# Patient Record
Sex: MALE | Race: WHITE
[De-identification: names, ages, dates, MRNs, and addresses within clinical notes are randomized per-mention and may not be internally consistent; named-entity substitution may affect disease eponyms.]

---

## 2019-02-23 ENCOUNTER — HOSPITAL ENCOUNTER (EMERGENCY)
Dept: HOSPITAL 14 - H.ER | Age: 44
Discharge: HOME | End: 2019-02-23
Payer: COMMERCIAL

## 2019-02-23 VITALS — BODY MASS INDEX: 24 KG/M2

## 2019-02-23 VITALS — HEART RATE: 96 BPM | TEMPERATURE: 98.8 F

## 2019-02-23 VITALS — DIASTOLIC BLOOD PRESSURE: 85 MMHG | SYSTOLIC BLOOD PRESSURE: 154 MMHG

## 2019-02-23 VITALS — OXYGEN SATURATION: 98 %

## 2019-02-23 VITALS — RESPIRATION RATE: 18 BRPM

## 2019-02-23 DIAGNOSIS — J11.1: Primary | ICD-10-CM

## 2019-02-23 LAB
ALBUMIN SERPL-MCNC: 4.2 G/DL (ref 3.5–5)
ALBUMIN/GLOB SERPL: 1.2 {RATIO} (ref 1–2.1)
ALT SERPL-CCNC: 24 U/L (ref 21–72)
ANISOCYTOSIS BLD QL SMEAR: SLIGHT
AST SERPL-CCNC: 22 U/L (ref 17–59)
BASOPHILS # BLD AUTO: 0 K/UL (ref 0–0.2)
BASOPHILS NFR BLD: 0.2 % (ref 0–2)
BILIRUB UR-MCNC: NEGATIVE MG/DL
BUN SERPL-MCNC: 17 MG/DL (ref 9–20)
CALCIUM SERPL-MCNC: 9.1 MG/DL (ref 8.4–10.2)
COLOR UR: (no result)
EOSINOPHIL # BLD AUTO: 0.1 K/UL (ref 0–0.7)
EOSINOPHIL NFR BLD: 1 % (ref 0–7)
EOSINOPHIL NFR BLD: 1.3 % (ref 0–4)
ERYTHROCYTE [DISTWIDTH] IN BLOOD BY AUTOMATED COUNT: 12.2 % (ref 11.5–14.5)
GFR NON-AFRICAN AMERICAN: > 60
GLUCOSE UR STRIP-MCNC: (no result) MG/DL
HGB BLD-MCNC: 12.9 G/DL (ref 12–18)
LEUKOCYTE ESTERASE UR-ACNC: (no result) LEU/UL
LYMPHOCYTE: 9 % (ref 20–50)
LYMPHOCYTES # BLD AUTO: 0.6 K/UL (ref 1–4.3)
LYMPHOCYTES NFR BLD AUTO: 9.6 % (ref 20–40)
MCH RBC QN AUTO: 29.8 PG (ref 27–31)
MCHC RBC AUTO-ENTMCNC: 33.7 G/DL (ref 33–37)
MCV RBC AUTO: 88.6 FL (ref 80–94)
MONOCYTE: 1 % (ref 0–10)
MONOCYTES # BLD: 0.4 K/UL (ref 0–0.8)
MONOCYTES NFR BLD: 6.2 % (ref 0–10)
NEUTROPHILS # BLD: 5 K/UL (ref 1.8–7)
NEUTROPHILS NFR BLD AUTO: 82 % (ref 42–75)
NEUTROPHILS NFR BLD AUTO: 82.7 % (ref 50–75)
NEUTS BAND NFR BLD: 3 % (ref 0–2)
NRBC BLD AUTO-RTO: 0 % (ref 0–0)
PH UR STRIP: 6 [PH] (ref 5–8)
PLATELET # BLD EST: NORMAL 10*3/UL
PLATELET # BLD: 173 K/UL (ref 130–400)
PMV BLD AUTO: 7.9 FL (ref 7.2–11.7)
PROT UR STRIP-MCNC: NEGATIVE MG/DL
RBC # BLD AUTO: 4.32 MIL/UL (ref 4.4–5.9)
RBC # UR STRIP: (no result) /UL
SP GR UR STRIP: 1.01 (ref 1–1.03)
SPHEROCYTES BLD QL SMEAR: SLIGHT
SQUAMOUS EPITHIAL: < 1 /HPF (ref 0–5)
TOTAL CELLS COUNTED BLD: 100
URINE CLARITY: CLEAR
UROBILINOGEN UR-MCNC: (no result) MG/DL (ref 0.2–1)
VARIANT LYMPHS NFR BLD MANUAL: 4 % (ref 0–0)
WBC # BLD AUTO: 6 K/UL (ref 4.8–10.8)

## 2019-02-23 PROCEDURE — 80053 COMPREHEN METABOLIC PANEL: CPT

## 2019-02-23 PROCEDURE — 81003 URINALYSIS AUTO W/O SCOPE: CPT

## 2019-02-23 PROCEDURE — 86308 HETEROPHILE ANTIBODY SCREEN: CPT

## 2019-02-23 PROCEDURE — 87804 INFLUENZA ASSAY W/OPTIC: CPT

## 2019-02-23 PROCEDURE — 96360 HYDRATION IV INFUSION INIT: CPT

## 2019-02-23 PROCEDURE — 87430 STREP A AG IA: CPT

## 2019-02-23 PROCEDURE — 99283 EMERGENCY DEPT VISIT LOW MDM: CPT

## 2019-02-23 PROCEDURE — 85025 COMPLETE CBC W/AUTO DIFF WBC: CPT

## 2019-02-23 PROCEDURE — 87040 BLOOD CULTURE FOR BACTERIA: CPT

## 2019-02-23 NOTE — ED PDOC
History of Present Illness


History of Present Illness: 





44 years old [place] male presents to ER for evaluation of flu-like symptoms 

for 1 day. Patient reports fever with T-max of 103 degree, chills, bodyaches and

nasal congestion. He states he took Ibuprofen and just completed Amoxicillin and

Augmentin. Patient denies taking flu vaccine, vomiting, diarrhea or shortness of

breath.





PMD: None provided





HPI: Influenza


Time Seen by Provider: 02/23/19 00:37


Chief Complaint: Flu-like Symptoms


Chief Complaint (Provider): Flu-like Symptoms


History Per: Patient


Exam Limitations: no limitations


Onset/Duration Of Symptoms: Days (x1)


Symptoms include: fever, bodyaches, nasal congestion.  denies: vomiting, 

diarrhea, difficulty breathing





Past Medical History


Reviewed: Historical Data, Nursing Documentation, Vital Signs


Vital Signs: 





                                Last Vital Signs











Temp  102.4 F H  02/23/19 00:30


 


Pulse  110 H  02/23/19 00:30


 


Resp  18   02/23/19 00:30


 


BP  154/85 H  02/23/19 00:30


 


Pulse Ox  98   02/23/19 00:30














- Medical History


PMH: No Chronic Diseases





- Surgical History


Other surgeries: Deviated septum





- Family History


Family History: States: Unknown Family Hx





- Social History


Current smoker - smoking cessation education provided: No


Alcohol: None


Drugs: Denies





- Home Medications


Home Medications: 


                                Ambulatory Orders











 Medication  Instructions  Recorded


 


Oseltamivir Cap [Tamiflu] 75 mg PO BID #10 cap 02/23/19














- Allergies


Allergies/Adverse Reactions: 


                                    Allergies











Allergy/AdvReac Type Severity Reaction Status Date / Time


 


No Known Allergies Allergy   Verified 02/23/19 00:38














Review of Systems


ROS Statement: Except As Marked, All Systems Reviewed And Found Negative


Constitutional: Positive for: Fever, Chills, Other (Bodyaches)


Respiratory: Negative for: Shortness of Breath


Gastrointestinal: Negative for: Vomiting, Diarrhea





Physical Exam





- Reviewed


Nursing Documentation Reviewed: Yes


Vital Signs Reviewed: Yes





- Physical Exam


Appears: Positive for: Well, No Acute Distress


Head Exam: Positive for: ATRAUMATIC, NORMOCEPHALIC


Skin: Positive for: Warm (Febrile)


Eye Exam: Positive for: Normal appearance, EOMI, PERRL


ENT: Positive for: Normal ENT Inspection


Neck: Positive for: Normal, Painless ROM, Supple


Cardiovascular/Chest: Positive for: Tachycardia


Respiratory: Positive for: Normal Breath Sounds.  Negative for: Respiratory 

Distress


Gastrointestinal/Abdominal: Positive for: Normal Exam, Soft.  Negative for: 

Tenderness


Back: Positive for: Normal Inspection.  Negative for: L CVA Tenderness, R CVA 

Tenderness


Extremity: Positive for: Normal ROM.  Negative for: Pedal Edema, Swelling


Neurologic/Psych: Positive for: Alert, Oriented (x3)





Medical Decision Making


Medical Decision Making: 





Time: 0038


Initial Impression: 44 years old male with flu-like symptoms


Initial Plan:


--CMP


--CBC


--Tamiflu 75 mg PO


--Toradol 30 mg IV


--Tylenol 325 mg PO


--Blood culture


--Mono


--Rapid strep group A antigen


--Influenza A B


--Urinalysis





0210


Labs reviewed and significant for positive strep and flu antigen. Patient 

reports improvement in symptoms and he is no longer febrile. Patient providing 

opinion that he is positive for strep due to recent treatment for strep. 


Given no clinical evidence of strep throat, will not treat for strep 

pharyngitis.


Diagnosis: Influenza


____________________________________________________________


Scribe Attestation:


Documented by Emily Dunn, acting as a scribe for Doroteo Whitney MD.





Provider Scribe Attestation:


All medical record entries made by the Scribe were at my direction and 

personally dictated by me. I have reviewed the chart and agree that the record 

accurately reflects my personal performance of the history, physical exam, 

medical decision making, and the department course for this patient. I have also

 personally directed, reviewed, and agree with the discharge instructions and 

disposition.





- Laboratory Results


Result Diagrams: 


                                 02/23/19 01:00





                                 02/23/19 01:00





- ECG


O2 Sat by Pulse Oximetry: 98 (RA)


Pulse Ox Interpretation: Normal





Disposition





- Clinical Impression


Clinical Impression: 


 Influenza-like symptoms








- Patient ED Disposition


Is Patient to be Admitted: No





- Disposition


Disposition: Routine/Home


Disposition Time: 02:10


Condition: IMPROVED


Prescriptions: 


Oseltamivir Cap [Tamiflu] 75 mg PO BID #10 cap


Instructions:  Flu


Forms:  [employer] (English)